# Patient Record
Sex: FEMALE | Race: WHITE | URBAN - METROPOLITAN AREA
[De-identification: names, ages, dates, MRNs, and addresses within clinical notes are randomized per-mention and may not be internally consistent; named-entity substitution may affect disease eponyms.]

---

## 2018-05-23 ENCOUNTER — APPOINTMENT (RX ONLY)
Dept: URBAN - METROPOLITAN AREA CLINIC 41 | Facility: CLINIC | Age: 68
Setting detail: DERMATOLOGY
End: 2018-05-23

## 2018-05-23 DIAGNOSIS — L71.8 OTHER ROSACEA: ICD-10-CM

## 2018-05-23 DIAGNOSIS — L82.1 OTHER SEBORRHEIC KERATOSIS: ICD-10-CM

## 2018-05-23 PROBLEM — L70.0 ACNE VULGARIS: Status: ACTIVE | Noted: 2018-05-23

## 2018-05-23 PROBLEM — J45.909 UNSPECIFIED ASTHMA, UNCOMPLICATED: Status: ACTIVE | Noted: 2018-05-23

## 2018-05-23 PROCEDURE — ? TREATMENT REGIMEN

## 2018-05-23 PROCEDURE — 99201: CPT

## 2018-05-23 PROCEDURE — ? OTHER

## 2018-05-23 PROCEDURE — ? COUNSELING

## 2018-05-23 ASSESSMENT — LOCATION ZONE DERM
LOCATION ZONE: FACE
LOCATION ZONE: TRUNK

## 2018-05-23 ASSESSMENT — LOCATION DETAILED DESCRIPTION DERM
LOCATION DETAILED: RIGHT SUPERIOR UPPER BACK
LOCATION DETAILED: LEFT INFERIOR CENTRAL MALAR CHEEK

## 2018-05-23 ASSESSMENT — LOCATION SIMPLE DESCRIPTION DERM
LOCATION SIMPLE: LEFT CHEEK
LOCATION SIMPLE: RIGHT UPPER BACK

## 2018-05-23 NOTE — PROCEDURE: TREATMENT REGIMEN
Samples Given: Finacea to treat the bumps
Detail Level: Simple
Plan: Discussed rhofade to treat the redness and also discussed soolantra. Gave samples of Finacea to try for two weeks then try the rosacea triple gel sample for two weeks to see which one she likes better. Patient will call to let us know if she would like a prescription of either

## 2018-05-23 NOTE — PROCEDURE: OTHER
Note Text (......Xxx Chief Complaint.): This diagnosis correlates with the
Other (Free Text): Discussed we can treat the redness with Cutera laser
Detail Level: Simple

## 2019-10-17 ENCOUNTER — APPOINTMENT (RX ONLY)
Dept: URBAN - METROPOLITAN AREA CLINIC 41 | Facility: CLINIC | Age: 69
Setting detail: DERMATOLOGY
End: 2019-10-17

## 2019-10-17 DIAGNOSIS — L71.8 OTHER ROSACEA: ICD-10-CM | Status: IMPROVED

## 2019-10-17 PROCEDURE — ? TREATMENT REGIMEN

## 2019-10-17 PROCEDURE — 99212 OFFICE O/P EST SF 10 MIN: CPT

## 2019-10-17 PROCEDURE — ? PRODUCT LINE (PHARMACEUTICALS)

## 2019-10-17 PROCEDURE — ? COUNSELING

## 2019-10-17 ASSESSMENT — LOCATION SIMPLE DESCRIPTION DERM: LOCATION SIMPLE: LEFT CHEEK

## 2019-10-17 ASSESSMENT — LOCATION ZONE DERM: LOCATION ZONE: FACE

## 2019-10-17 ASSESSMENT — LOCATION DETAILED DESCRIPTION DERM: LOCATION DETAILED: LEFT INFERIOR CENTRAL MALAR CHEEK

## 2019-10-17 NOTE — PROCEDURE: PRODUCT LINE (PHARMACEUTICALS)
Product 17 Price (In Dollars - Numeric Only, No Special Characters Or $): 0.00
Product 2 Units: 0
Product 5 Price (In Dollars - Numeric Only, No Special Characters Or $): 50.00
Product 7 Application Directions: Apply a pea size amount to entire face nightly. Patient can have 11 refills.
Name Of Product 13: Azeloyl hydrating cream
Send Charges To Patient Encounter: Yes
Product 10 Application Directions: Apply pea size amount every night
Name Of Product 5: Rosacea triple gel
Name Of Product 11: Tacrolimus
Name Of Product 2: Clobetasol solution
Product 8 Application Directions: Apply a pea size amount to the face nightly. Patient can have refills for a year.
Product 4 Application Directions: Apply to areas of dark pigmentation once daily for 8 weeks
Product 11 Price (In Dollars - Numeric Only, No Special Characters Or $): 45.00
Name Of Product 7: Tretinoin 0.025%
Product 1 Application Directions: Apply to affected areas QD- BID prn flares
Name Of Product 9: Tretinoin 0.1%
Detail Level: Zone
Product 6 Application Directions: Apply affected areas QD to BID
Product 3 Application Directions: Wash affected area of scalp once daily
Name Of Product 14: Tazorac 0.05
Product 11 Application Directions: Apply to areas of vitiligo on the neck daily
Name Of Product 4: Melasma Emulsion
Name Of Product 12: Actinic Keratosis gel
Product 3 Price (In Dollars - Numeric Only, No Special Characters Or $): 40.00
Product 9 Application Directions: Apply pea size amount to face every night
Name Of Product 1: Clobetasol cream
Name Of Product 6: Tazarotene
Name Of Product 3: Clobetasol Shampoo
Name Of Product 10: Tretinoin 0.05%
Product 5 Application Directions: Apply to affected areas once daily. Patient can have 11 refills
Product 2 Application Directions: Apply to affected areas QD-BID
Name Of Product 8: Acne triple gel
Name Of Product 15: Econazole cream
Product 5 Units: 1

## 2021-02-19 ENCOUNTER — APPOINTMENT (RX ONLY)
Dept: URBAN - METROPOLITAN AREA CLINIC 41 | Facility: CLINIC | Age: 71
Setting detail: DERMATOLOGY
End: 2021-02-19

## 2021-02-19 DIAGNOSIS — L71.8 OTHER ROSACEA: ICD-10-CM | Status: STABLE

## 2021-02-19 PROCEDURE — ? PRODUCT LINE (PHARMACEUTICALS)

## 2021-02-19 PROCEDURE — ? TREATMENT REGIMEN

## 2021-02-19 PROCEDURE — 99213 OFFICE O/P EST LOW 20 MIN: CPT

## 2021-02-19 PROCEDURE — ? COUNSELING

## 2021-02-19 ASSESSMENT — LOCATION DETAILED DESCRIPTION DERM: LOCATION DETAILED: LEFT MEDIAL FOREHEAD

## 2021-02-19 ASSESSMENT — LOCATION SIMPLE DESCRIPTION DERM: LOCATION SIMPLE: LEFT FOREHEAD

## 2021-02-19 ASSESSMENT — LOCATION ZONE DERM: LOCATION ZONE: FACE

## 2021-02-19 NOTE — PROCEDURE: PRODUCT LINE (PHARMACEUTICALS)
Product 45 Price (In Dollars - Numeric Only, No Special Characters Or $): 0.00
Product 47 Units: 0
Product 5 Application Directions: Apply to affected areas once daily. Patient can have 11 refills
Render Product Pricing In Note: Yes
Name Of Product 3: Clobetasol Shampoo
Product 8 Price (In Dollars - Numeric Only, No Special Characters Or $): 50.00
Name Of Product 6: Tazarotene
Name Of Product 15: Econazole cream
Product 3 Price (In Dollars - Numeric Only, No Special Characters Or $): 40.00
Product 10 Application Directions: Apply pea size amount every night
Name Of Product 13: Azeloyl hydrating cream
Name Of Product 1: Clobetasol cream
Name Of Product 11: Tacrolimus
Product 8 Application Directions: Apply a pea size amount to the face nightly. Patient can have refills for a year.
Detail Level: Zone
Product 3 Application Directions: Wash affected area of scalp once daily
Name Of Product 9: Tretinoin 0.1%
Product 11 Price (In Dollars - Numeric Only, No Special Characters Or $): 45.00
Product 6 Application Directions: Apply affected areas QD to BID patient can have 6 refills
Name Of Product 4: Melasma Emulsion
Assigning Risk Information: Per AMA, level of risk is based upon consequences of the problem(s) addressed at the encounter when appropriately treated. Risk also includes medical decision making related to the need to initiate or forego further testing, treatment and/or hospitalization. Over the counter medication are assigned a risk level of low. Prescription medication management is assigned a risk level of moderate.
Name Of Product 7: Tretinoin 0.025%
Product 1 Application Directions: Apply to affected areas QD- BID prn flares
Product 11 Application Directions: Apply to areas of vitiligo on the neck daily
Risk Of Complication Category: Minimal
Name Of Product 2: Clobetasol solution
Name Of Product 14: Tazorac 0.05
Product 9 Application Directions: Apply pea size amount to face every night
Name Of Product 12: Actinic Keratosis gel
Product 4 Application Directions: Apply to areas of dark pigmentation once daily for 8 weeks
Product 7 Application Directions: Apply a pea size amount to entire face nightly. Patient can have 11 refills.
Name Of Product 10: Tretinoin 0.05%
Product 2 Application Directions: Apply to affected areas QD-BID
Name Of Product 5: Rosacea triple gel
Product 5 Units: 1
Name Of Product 8: Acne triple gel

## 2021-06-23 ENCOUNTER — RX ONLY (OUTPATIENT)
Age: 71
Setting detail: RX ONLY
End: 2021-06-23

## 2021-06-23 RX ORDER — IVERMECTIN/METRONIDAZOLE/NIACI 1 %-1 %-4%
1 GEL (GRAM) TOPICAL QD
Qty: 1 | Refills: 6 | Status: ERX

## 2021-07-16 ENCOUNTER — APPOINTMENT (RX ONLY)
Dept: URBAN - METROPOLITAN AREA CLINIC 41 | Facility: CLINIC | Age: 71
Setting detail: DERMATOLOGY
End: 2021-07-16

## 2021-07-16 DIAGNOSIS — M71 OTHER BURSOPATHIES: ICD-10-CM

## 2021-07-16 DIAGNOSIS — L82.1 OTHER SEBORRHEIC KERATOSIS: ICD-10-CM

## 2021-07-16 DIAGNOSIS — L98.9 DISORDER OF THE SKIN AND SUBCUTANEOUS TISSUE, UNSPECIFIED: ICD-10-CM

## 2021-07-16 PROBLEM — M71.341 OTHER BURSAL CYST, RIGHT HAND: Status: ACTIVE | Noted: 2021-07-16

## 2021-07-16 PROCEDURE — ? PRESCRIPTION

## 2021-07-16 PROCEDURE — 10060 I&D ABSCESS SIMPLE/SINGLE: CPT

## 2021-07-16 PROCEDURE — ? COUNSELING

## 2021-07-16 PROCEDURE — 99213 OFFICE O/P EST LOW 20 MIN: CPT | Mod: 25

## 2021-07-16 PROCEDURE — ? INCISION AND DRAINAGE

## 2021-07-16 RX ORDER — HALOBETASOL PROPIONATE 0.5 MG/G
1 CREAM TOPICAL BID
Qty: 1 | Refills: 6 | Status: ERX

## 2021-07-16 ASSESSMENT — LOCATION ZONE DERM
LOCATION ZONE: FACE
LOCATION ZONE: FINGER
LOCATION ZONE: ARM

## 2021-07-16 ASSESSMENT — LOCATION SIMPLE DESCRIPTION DERM
LOCATION SIMPLE: RIGHT ELBOW
LOCATION SIMPLE: RIGHT FOREHEAD
LOCATION SIMPLE: LEFT ELBOW
LOCATION SIMPLE: RIGHT MIDDLE FINGER

## 2021-07-16 ASSESSMENT — LOCATION DETAILED DESCRIPTION DERM
LOCATION DETAILED: RIGHT ELBOW
LOCATION DETAILED: RIGHT MID DORSAL MIDDLE FINGER
LOCATION DETAILED: LEFT ELBOW
LOCATION DETAILED: RIGHT INFERIOR FOREHEAD

## 2021-07-16 NOTE — PROCEDURE: INCISION AND DRAINAGE
Detail Level: Detailed
Lesion Type: Cyst
Method: lancet
Curette: No
Anesthesia Volume In Cc: 1
Size Of Lesion In Cm (Optional But May Be Required For Some Insurances): 0
Drainage Type?: clear
Dressing: Band-Aid
Epidermal Sutures: 4-0 Ethilon
Epidermal Closure: simple interrupted
Suture Text: The incision was partially closed with
Preparation Text: The area was prepped in the usual clean fashion.
Curette Text (Optional): After the contents were expressed a curette was used to partially remove the cyst wall.
Consent was obtained and risks were reviewed including but not limited to delayed wound healing, infection, need for multiple I and D's, and pain.
Render Postcare In Note?: Yes
Post-Care Instructions: I reviewed with the patient in detail post-care instructions. Patient should keep wound covered and call the office should any redness, pain, swelling or worsening occur.

## 2022-10-21 ENCOUNTER — APPOINTMENT (RX ONLY)
Dept: URBAN - METROPOLITAN AREA CLINIC 41 | Facility: CLINIC | Age: 72
Setting detail: DERMATOLOGY
End: 2022-10-21

## 2022-10-21 DIAGNOSIS — L82.1 OTHER SEBORRHEIC KERATOSIS: ICD-10-CM

## 2022-10-21 DIAGNOSIS — L71.8 OTHER ROSACEA: ICD-10-CM | Status: STABLE

## 2022-10-21 PROCEDURE — ? COUNSELING

## 2022-10-21 PROCEDURE — 99213 OFFICE O/P EST LOW 20 MIN: CPT

## 2022-10-21 PROCEDURE — ? TREATMENT REGIMEN

## 2022-10-21 PROCEDURE — ? PRESCRIPTION

## 2022-10-21 RX ORDER — IVERMECTIN/METRONIDAZOLE/NIACI 1 %-1 %-4%
1 GEL (GRAM) TOPICAL QD
Qty: 30 | Refills: 11 | Status: ERX | COMMUNITY
Start: 2022-10-21

## 2022-10-21 RX ADMIN — Medication 1: at 00:00

## 2022-10-21 ASSESSMENT — LOCATION SIMPLE DESCRIPTION DERM: LOCATION SIMPLE: RIGHT FOREHEAD

## 2022-10-21 ASSESSMENT — LOCATION DETAILED DESCRIPTION DERM
LOCATION DETAILED: RIGHT FOREHEAD
LOCATION DETAILED: RIGHT INFERIOR MEDIAL FOREHEAD

## 2022-10-21 ASSESSMENT — LOCATION ZONE DERM: LOCATION ZONE: FACE

## 2022-10-21 NOTE — PROCEDURE: COUNSELING
Detail Level: Zone
Sunscreen Recommendations: Mineral based sunscreen daily
Cleanser Recommendations: Recommended gentle cleansers like Cerave Hydrating Facial Cleanser or Cerave Foaming Facial Cleanser